# Patient Record
Sex: MALE | Race: WHITE | NOT HISPANIC OR LATINO
[De-identification: names, ages, dates, MRNs, and addresses within clinical notes are randomized per-mention and may not be internally consistent; named-entity substitution may affect disease eponyms.]

---

## 2019-07-17 PROBLEM — Z00.00 ENCOUNTER FOR PREVENTIVE HEALTH EXAMINATION: Status: ACTIVE | Noted: 2019-07-17

## 2021-06-15 ENCOUNTER — APPOINTMENT (OUTPATIENT)
Dept: UROLOGY | Facility: CLINIC | Age: 41
End: 2021-06-15
Payer: COMMERCIAL

## 2021-06-15 ENCOUNTER — NON-APPOINTMENT (OUTPATIENT)
Age: 41
End: 2021-06-15

## 2021-06-15 VITALS
TEMPERATURE: 98.3 F | SYSTOLIC BLOOD PRESSURE: 115 MMHG | WEIGHT: 170 LBS | DIASTOLIC BLOOD PRESSURE: 72 MMHG | BODY MASS INDEX: 23.03 KG/M2 | HEART RATE: 69 BPM | HEIGHT: 72 IN

## 2021-06-15 DIAGNOSIS — E29.1 TESTICULAR HYPOFUNCTION: ICD-10-CM

## 2021-06-15 PROCEDURE — 99072 ADDL SUPL MATRL&STAF TM PHE: CPT

## 2021-06-15 PROCEDURE — 93975 VASCULAR STUDY: CPT

## 2021-06-15 PROCEDURE — 99204 OFFICE O/P NEW MOD 45 MIN: CPT | Mod: 25

## 2021-06-15 RX ORDER — DEXTROAMPHETAMINE SACCHARATE, AMPHETAMINE ASPARTATE, DEXTROAMPHETAMINE SULFATE, AND AMPHETAMINE SULFATE 3.75; 3.75; 3.75; 3.75 MG/1; MG/1; MG/1; MG/1
TABLET ORAL
Refills: 0 | Status: ACTIVE | COMMUNITY

## 2021-06-15 NOTE — LETTER BODY
[Dear  ___] : Dear  [unfilled], [FreeTextEntry1] : I had the pleasure of seeing JULIO CESAR YEAGER, a 41 year old man,  to your patient Avis Barrett, in the office in consultation today. Please see the attached note for full details.\par \par Thank you very much for allowing me to participate in the care of this patient. If you have any questions please feel free to call me at any time. \par \par \par Sincerely yours,\par \par \par \par Osorio Paniagua MD, BENJI\par Director, Male Fertility and Microsurgery\par  of Urology\par [facility]\par  [FreeTextEntry2] : Matthew Sherwood\par [facility]\par [address]\[place] [address]

## 2021-06-15 NOTE — PHYSICAL EXAM
[General Appearance - Well Developed] : well developed [General Appearance - Well Nourished] : well nourished [Normal Appearance] : normal appearance [Well Groomed] : well groomed [Heart Rate And Rhythm] : Heart rate and rhythm were normal [] : no respiratory distress [Penis Abnormality] : normal circumcised penis [Urethral Meatus] : meatus normal [Urinary Bladder Findings] : the bladder was normal on palpation [Scrotum] : the scrotum was normal [Epididymis] : the epididymides were normal [Testes Tenderness] : no tenderness of the testes [Testes Mass (___cm)] : there were no testicular masses [FreeTextEntry1] : grade I bialteral varicocele. 14cc firm testes bialterally

## 2021-06-15 NOTE — ASSESSMENT
[FreeTextEntry1] : hypogonadism\par moderate bilateral varicocele proven on in office sonography today\par T FSH LH E2\par Y microdeletion\par karyotype\par We had an in depth conversation regarding the benefit of varicocelectomy today. He understands the literature which overwhelming reports a benefit in improvement in semen parameters. He also understands that there is limited data in terms of spontaneous pregnancy and take home baby rates.There is good data suggesting improved semen parameters which can possibly obviate the need for future IVF and has been shown to improve IVF outcomes in selected patients. We discussed the recent results of a [place] review which reported that one natural pregnancy is achieved for every 3-5 varicocele repairs over an unknown time frame. We also spoke about the fact that there is a 3 to 6 month delay before improvement is seen in semen parameters. The role of advancing maternal age was discussed in depth.\par \par Surgical risks, including a risk of infection, injury to the testicular artery (extremely rare), injury to the vas deferens and hydrocele were discussed, as was post operative pain and pain control. Post operative skin numbness in the anterior thigh/lateral scrotum were also discussed.\par given uncertainty and delay in benefit considering proceeding to IVF straigthaway\par will review hormonal eval by phone\par

## 2021-06-15 NOTE — HISTORY OF PRESENT ILLNESS
[FreeTextEntry1] : 41M  to [facility] ([id number]) attempting pregnancy x 6months. no previous pregancnies either partner. negatvie female factor eval. \par no ED. no ejauclatory dysfunciotn. mildly decreased lbidio.\par SA:\par 3.3ml/2.8 mil/ml/36% motility\par no addiotnal complaints

## 2021-06-28 LAB
ESTRADIOL SERPL-MCNC: 30 PG/ML
FSH SERPL-MCNC: 6.1 IU/L
LH SERPL-ACNC: 6.7 IU/L
TESTOST BND SERPL-MCNC: 14.6 PG/ML
TESTOSTERONE TOTAL S: 681 NG/DL

## 2021-07-08 LAB — Y CHROMOSOME MICRODELETION, DNA ANALYSIS: NORMAL

## 2021-07-12 ENCOUNTER — NON-APPOINTMENT (OUTPATIENT)
Age: 41
End: 2021-07-12

## 2021-07-23 ENCOUNTER — NON-APPOINTMENT (OUTPATIENT)
Age: 41
End: 2021-07-23

## 2021-07-26 ENCOUNTER — NON-APPOINTMENT (OUTPATIENT)
Age: 41
End: 2021-07-26

## 2021-08-03 ENCOUNTER — NON-APPOINTMENT (OUTPATIENT)
Age: 41
End: 2021-08-03